# Patient Record
Sex: FEMALE | Race: ASIAN | NOT HISPANIC OR LATINO | Employment: FULL TIME | URBAN - METROPOLITAN AREA
[De-identification: names, ages, dates, MRNs, and addresses within clinical notes are randomized per-mention and may not be internally consistent; named-entity substitution may affect disease eponyms.]

---

## 2021-04-06 ENCOUNTER — BULK ORDERING (OUTPATIENT)
Dept: CASE MANAGEMENT | Facility: OTHER | Age: 46
End: 2021-04-06

## 2021-04-06 DIAGNOSIS — Z23 IMMUNIZATION DUE: ICD-10-CM

## 2021-11-10 ENCOUNTER — APPOINTMENT (OUTPATIENT)
Dept: WOMENS IMAGING | Facility: HOSPITAL | Age: 46
End: 2021-11-10

## 2021-11-10 PROCEDURE — 77067 SCR MAMMO BI INCL CAD: CPT | Performed by: RADIOLOGY

## 2021-11-10 PROCEDURE — 77063 BREAST TOMOSYNTHESIS BI: CPT | Performed by: RADIOLOGY

## 2021-12-21 ENCOUNTER — APPOINTMENT (OUTPATIENT)
Dept: WOMENS IMAGING | Facility: HOSPITAL | Age: 46
End: 2021-12-21

## 2021-12-21 PROCEDURE — G0279 TOMOSYNTHESIS, MAMMO: HCPCS | Performed by: RADIOLOGY

## 2021-12-21 PROCEDURE — 76641 ULTRASOUND BREAST COMPLETE: CPT | Performed by: RADIOLOGY

## 2021-12-21 PROCEDURE — 77065 DX MAMMO INCL CAD UNI: CPT | Performed by: RADIOLOGY

## 2021-12-21 PROCEDURE — 77061 BREAST TOMOSYNTHESIS UNI: CPT | Performed by: RADIOLOGY

## 2024-11-15 ENCOUNTER — OFFICE VISIT (OUTPATIENT)
Dept: SURGERY | Facility: CLINIC | Age: 49
End: 2024-11-15
Payer: COMMERCIAL

## 2024-11-15 VITALS
OXYGEN SATURATION: 97 % | HEIGHT: 63 IN | WEIGHT: 143.2 LBS | BODY MASS INDEX: 25.37 KG/M2 | SYSTOLIC BLOOD PRESSURE: 108 MMHG | DIASTOLIC BLOOD PRESSURE: 68 MMHG | HEART RATE: 87 BPM

## 2024-11-15 DIAGNOSIS — K59.03 DRUG-INDUCED CONSTIPATION: ICD-10-CM

## 2024-11-15 DIAGNOSIS — L29.0 PRURITUS ANI: ICD-10-CM

## 2024-11-15 DIAGNOSIS — K64.4 ANAL SKIN TAG: Primary | ICD-10-CM

## 2024-11-15 RX ORDER — ESCITALOPRAM OXALATE 10 MG/1
1 TABLET ORAL DAILY
COMMUNITY
Start: 2024-08-08

## 2024-11-15 RX ORDER — ROSUVASTATIN CALCIUM 5 MG/1
5 TABLET, COATED ORAL DAILY
COMMUNITY
Start: 2024-08-05 | End: 2025-02-01

## 2024-11-15 RX ORDER — LORATADINE 10 MG/1
10 TABLET ORAL DAILY
COMMUNITY

## 2024-11-15 RX ORDER — CLOBETASOL PROPIONATE 0.5 MG/G
1 CREAM TOPICAL TAKE AS DIRECTED
Qty: 30 G | Refills: 0 | Status: SHIPPED | OUTPATIENT
Start: 2024-11-15 | End: 2024-11-29

## 2024-11-15 RX ORDER — DIPHENOXYLATE HYDROCHLORIDE AND ATROPINE SULFATE 2.5; .025 MG/1; MG/1
1 TABLET ORAL DAILY
COMMUNITY

## 2024-11-15 NOTE — PROGRESS NOTES
Eden Waldrop is a 48 y.o. female who is seen as a consult at the request of Self Referring for Hemorrhoids.      HPI:  Pt presents today for evaluation of prolapsing tissue from the anus x2 months.   Prolapses out during defecation and has to manual reduce the tissue.   It is not painful, but does cause irritation of the perianal skin.   Pt describes a chronic and intense itching sensation, that often causes sleep disturbances.    Denies using any creams or suppositories.     Pt states that she used to have regular bowel function, but has become more constipated since starting Semaglutide.   Strains at times during defecation and believes that's what caused the prolapsing tissue.   Taking 2 fiber gummies daily.     Pt had a colonoscopy 15 years ago.  Was told that the exam was normal and that no polyps were removed.   2 weeks after her colonoscopy, developed heavy RB.   She went to the ER and was admitted to ICU for 3 days for GI bleed and hypotension.   States she was found to have small tears in the colon causing the bleeding.   Has not had another colonoscopy since then.  Screened with Cologuard 07/28/2024 and was negative.     Not taking any anticoagulation.  No previous anorectal surgeries.   No known FHx of colon polyps, colon cancer, or IBD.     History reviewed. No pertinent past medical history.    History reviewed. No pertinent surgical history.    Social History:   reports that she does not drink alcohol and does not use drugs.      Marriage status:     History reviewed. No pertinent family history.      Current Outpatient Medications:     escitalopram (LEXAPRO) 10 MG tablet, Take 1 tablet by mouth Daily., Disp: , Rfl:     loratadine (CLARITIN) 10 MG tablet, Take 1 tablet by mouth Daily., Disp: , Rfl:     multivitamin (MULTIPLE VITAMINS PO), Take 1 tablet by mouth Daily., Disp: , Rfl:     OMEPRAZOLE PO, Take 20 mg by mouth Daily., Disp: , Rfl:     rosuvastatin (CRESTOR) 5 MG tablet, Take 1 tablet  by mouth Daily., Disp: , Rfl:     Semaglutide,0.25 or 0.5MG/DOS, (OZEMPIC) 2 MG/1.5ML solution pen-injector, INJECT 0.5ML (50 UNITS ON INSULIN SYRINGE) INTO SKIN ONCE WEEKLY, Disp: , Rfl:     clobetasol propionate (TEMOVATE) 0.05 % cream, Apply 1 Application topically to the appropriate area as directed Take As Directed for 14 days. Apply twice a day for 1 week. Then apply once a day for 1 week., Disp: 30 g, Rfl: 0    Allergy  Penicillins      Vitals:    11/15/24 1336   BP: 108/68   Pulse: 87   SpO2: 97%     Body mass index is 25.37 kg/m².        Physical Exam  Exam conducted with a chaperone present.   Constitutional:       General: She is not in acute distress.     Appearance: She is well-developed.   HENT:      Head: Normocephalic and atraumatic.      Nose: Nose normal.   Eyes:      Conjunctiva/sclera: Conjunctivae normal.      Pupils: Pupils are equal, round, and reactive to light.   Neck:      Trachea: No tracheal deviation.   Pulmonary:      Effort: Pulmonary effort is normal. No respiratory distress.      Breath sounds: Normal breath sounds.   Abdominal:      General: Bowel sounds are normal. There is no distension.      Palpations: Abdomen is soft.   Genitourinary:     Comments: Perianal exam: Thickened and excoriated perianal tissue circumferentially. Anal skin tag left lateral.    AYAZ- Good tone, no masses  Anoscopy performed: Internal hemorrhoids WNL. Small amount of extra tissue/skin tag left laterally.   Musculoskeletal:         General: No deformity. Normal range of motion.      Cervical back: Normal range of motion.   Skin:     General: Skin is warm and dry.   Neurological:      Mental Status: She is alert and oriented to person, place, and time.      Cranial Nerves: No cranial nerve deficit.      Coordination: Coordination normal.      Gait: Gait normal.   Psychiatric:         Behavior: Behavior normal.         Judgment: Judgment normal.           Review of Medical Record:  I reviewed medical records  as detailed in HPI.     Cologuamarco 07/28/2024:  - Negative    Assessment:    1. Anal skin tag    2. Drug-induced constipation    3. Pruritus ani    - New    Plan:   - Discussed physical exam findings with Pt and causes of anal skin tags. Did not recommended removing the skin tag as it is small and the scar tissue post-excision would likely be similar in size.  - Recommended the avoidance of excessive itching/wiping as this will cause worsening perianal skin irritation. Rx for Clobetasol provided. Pt instructed to apply externally BID x1 week, and then QD x1 week. After 2 weeks, will transition to a barrier cream (Aquaphor, Desitin, Calmoseptine, etc).   - Continue fiber. Start Miralax to avoid constipation/straining.   - Follow up in 6-8 weeks.